# Patient Record
Sex: FEMALE | Race: WHITE | NOT HISPANIC OR LATINO | Employment: FULL TIME | URBAN - METROPOLITAN AREA
[De-identification: names, ages, dates, MRNs, and addresses within clinical notes are randomized per-mention and may not be internally consistent; named-entity substitution may affect disease eponyms.]

---

## 2018-08-17 ENCOUNTER — HOSPITAL ENCOUNTER (EMERGENCY)
Facility: HOSPITAL | Age: 38
Discharge: HOME | End: 2018-08-18
Attending: EMERGENCY MEDICINE | Admitting: EMERGENCY MEDICINE
Payer: MEDICAID

## 2018-08-17 VITALS
SYSTOLIC BLOOD PRESSURE: 117 MMHG | OXYGEN SATURATION: 98 % | HEART RATE: 85 BPM | TEMPERATURE: 98.2 F | RESPIRATION RATE: 18 BRPM | DIASTOLIC BLOOD PRESSURE: 76 MMHG

## 2018-08-17 DIAGNOSIS — H57.89 EYE DRAINAGE: Primary | ICD-10-CM

## 2018-08-18 PROCEDURE — 99283 EMERGENCY DEPT VISIT LOW MDM: CPT

## 2018-08-18 RX ORDER — SULFACETAMIDE SODIUM 100 MG/ML
2 SOLUTION/ DROPS OPHTHALMIC ONCE
Status: COMPLETED | OUTPATIENT
Start: 2018-08-18 | End: 2018-08-18

## 2018-08-18 RX ORDER — SULFACETAMIDE SODIUM 100 MG/ML
1-2 SOLUTION/ DROPS OPHTHALMIC EVERY 4 HOURS
Qty: 5 ML | Refills: 0 | Status: SHIPPED | OUTPATIENT
Start: 2018-08-18

## 2018-08-18 RX ADMIN — SULFACETAMIDE SODIUM 2 DROP: 100 SOLUTION OPHTHALMIC at 02:10

## 2018-08-18 NOTE — ED PROVIDER NOTES
History  Chief Complaint   Patient presents with    Eye Pain     painful right eye, times 3 days, worsens at nite  no drainage noted  today seeing blurry  no contacts or glasses       History provided by:  Patient  Eye Pain   Location:  Left eye with some redness and itchy, not really painful  Quality:  Aching  Severity:  Mild  Onset quality:  Gradual  Duration:  2 days  Timing:  Constant  Progression:  Worsening  Chronicity:  New  Context:  Pt has no eye trauma and does not wear conctacts/glasses  Relieved by:  Nothing  Worsened by:  Nothing  Ineffective treatments:  None  Associated symptoms: no abdominal pain, no chest pain, no fatigue, no fever, no headaches and no vomiting        None       History reviewed  No pertinent past medical history  History reviewed  No pertinent surgical history  History reviewed  No pertinent family history  I have reviewed and agree with the history as documented  Social History   Substance Use Topics    Smoking status: Never Smoker    Smokeless tobacco: Never Used    Alcohol use No        Review of Systems   Constitutional: Negative for fatigue and fever  Eyes: Positive for pain  Cardiovascular: Negative for chest pain  Gastrointestinal: Negative for abdominal pain and vomiting  Neurological: Negative for headaches  All other systems reviewed and are negative  Physical Exam  Physical Exam   Constitutional: She appears well-developed and well-nourished  HENT:   Head: Normocephalic and atraumatic  Eyes: EOM are normal  Pupils are equal, round, and reactive to light  Right eye exhibits no chemosis and no discharge  No foreign body present in the right eye  Left eye exhibits no chemosis and no discharge  No foreign body present in the left eye  Right conjunctiva is injected  Left conjunctiva is injected  Right eye exhibits normal extraocular motion  Left eye exhibits normal extraocular motion     Fundoscopic exam:       The right eye shows no hemorrhage  The left eye shows no hemorrhage  Slit lamp exam:       The right eye shows no corneal abrasion and no fluorescein uptake  The left eye shows no corneal abrasion and no fluorescein uptake  Neck: Neck supple  Cardiovascular: Normal rate  Pulmonary/Chest: Effort normal    Neurological: She is alert  Vitals reviewed  Vital Signs  ED Triage Vitals [08/17/18 2348]   Temperature Pulse Respirations Blood Pressure SpO2   98 2 °F (36 8 °C) 85 18 117/76 98 %      Temp Source Heart Rate Source Patient Position - Orthostatic VS BP Location FiO2 (%)   Oral Monitor Lying Right arm --      Pain Score       --           Vitals:    08/17/18 2348   BP: 117/76   Pulse: 85   Patient Position - Orthostatic VS: Lying       Visual Acuity  Visual Acuity      Most Recent Value   Visual acuity R eye is  20/20   Visual acuity Left eye is  20/20   Visual acuity in both eyes is  20/20          ED Medications  Medications   sulfacetamide (BLEPH-10) 10 % ophthalmic solution 2 drop (2 drops Left Eye Given 8/18/18 0210)       Diagnostic Studies  Results Reviewed     None                 No orders to display              Procedures  Procedures       Phone Contacts  ED Phone Contact    ED Course  ED Course as of Aug 29 1439   Sat Aug 18, 2018   0155 Left eye pressure 8                                MDM  Number of Diagnoses or Management Options  Eye drainage: new and does not require workup    CritCare Time    Disposition  Final diagnoses:   Eye drainage     Time reflects when diagnosis was documented in both MDM as applicable and the Disposition within this note     Time User Action Codes Description Comment    8/18/2018  1:50 AM Hamlet Mahan Add [H57 8] Eye drainage       ED Disposition     ED Disposition Condition Comment    Discharge  Vevelyn Sames discharge to home/self care      Condition at discharge: Good        Follow-up Information     Follow up With Specialties Details Why Contact Info Additional Information    [facility] Emergency Medicine   [address] [facility] [address]  [phone] [place] ED, [address] [address]   Call in 1 day  [address]  [phone]           Discharge Medication List as of 8/18/2018  1:56 AM      START taking these medications    Details   sulfacetamide (BLEPH-10) 10 % ophthalmic solution Administer 1-2 drops into the left eye every 4 (four) hours, Starting Sat 8/18/2018, Print           No discharge procedures on file      ED Provider  Electronically Signed by           Den Boykin MD  08/29/18 7260

## 2018-08-18 NOTE — DISCHARGE INSTRUCTIONS
Eye Pain   WHAT YOU NEED TO KNOW:   Eye pain may be caused by a problem within your eye  A problem or condition in another body area can also cause pain that travels to your eye  Some causes of eye pain include dry eyes, inflammation, a sinus infection, or a cluster headache  DISCHARGE INSTRUCTIONS:   Medicines: You may need any of the following:  · Artificial tears  are eyedrops that can help moisturize your eyes and relieve your pain  Ask your healthcare provider how often to use artificial tears  · NSAIDs , such as ibuprofen, help decrease swelling, pain, and fever  This medicine is available with or without a doctor's order  NSAIDs can cause stomach bleeding or kidney problems in certain people  If you take blood thinner medicine, always ask if NSAIDs are safe for you  Always read the medicine label and follow directions  Do not give these medicines to children under 10months of age without direction from your child's healthcare provider  · Take your medicine as directed  Contact your healthcare provider if you think your medicine is not helping or if you have side effects  Tell him of her if you are allergic to any medicine  Keep a list of the medicines, vitamins, and herbs you take  Include the amounts, and when and why you take them  Bring the list or the pill bottles to follow-up visits  Carry your medicine list with you in case of an emergency  Follow up with your healthcare provider as directed: You may be referred to an eye specialist  Write down your questions so you remember to ask them during your visits  Contact your healthcare provider if:   · You have a fever  · Your eye pain gets worse when you move your eyes  · You have questions or concerns about your condition or care  Return to the emergency department if:   · You have any vision loss  · You have sudden vision changes such as blurred vision, double vision, or seeing halos around lights       · You develop severe eye pain  © 2017 [facility] Information is for End User's use only and may not be sold, redistributed or otherwise used for commercial purposes  All illustrations and images included in CareNotes® are the copyrighted property of A D A M [employer] Inc  or Godfrey Ramos  The above information is an  only  It is not intended as medical advice for individual conditions or treatments  Talk to your doctor, nurse or pharmacist before following any medical regimen to see if it is safe and effective for you

## 2022-01-07 ENCOUNTER — NURSE TRIAGE (OUTPATIENT)
Dept: OTHER | Facility: OTHER | Age: 42
End: 2022-01-07

## 2022-01-07 DIAGNOSIS — Z20.822 ENCOUNTER FOR SCREENING LABORATORY TESTING FOR COVID-19 VIRUS: Primary | ICD-10-CM

## 2022-01-07 NOTE — TELEPHONE ENCOUNTER
Patient is requesting a covid test and does not have a [facility] PCP  Order placed  Patient informed of [address] now testing site and was advised of hours of operation, address, to wear a mask, and to stay in the car  Patient verbalized understanding  Patient already has a My Chart account to check for results  Advised patient to begin checking in 2-3 days after testing is completed  Reason for Disposition   [1] COVID-19 infection suspected by caller or triager AND [2] mild symptoms (cough, fever, or others) AND [3] has not gotten tested yet    Answer Assessment - Initial Assessment Questions  Were you within 6 feet or less, for up to 15 minutes or more with a person that has a confirmed COVID-19 test? Denies    What was the date of your exposure? Unknown    Are you experiencing any symptoms attributed to the virus?  (Assess for SOB, cough, fever, difficulty breathing) Fever (101 1F), chills, sore throat, cough, runny nose, headache, body aches, fatigue, mild loss of smell    HIGH RISK: Do you have any history heart or lung conditions, weakened immune system, diabetes, Asthma, CHF, HIV, COPD, Chemo, renal failure, sickle cell, etc? Denies    PREGNANCY: Are you pregnant or did you recently give birth? Denies    VACCINE: "Have you gotten the COVID-19 vaccine?" If Yes ask: "Which one, how many shots, when did you get it?" Yes, Pfizer x 2 shots; no booster yet    Protocols used: CORONAVIRUS (COVID-19) DIAGNOSED OR SUSPECTED-ADULT-[place]

## 2022-01-07 NOTE — TELEPHONE ENCOUNTER
Regarding: Covid Symptoamtic Chills 1 of 5 (Angie/Lyndsay)  ----- Message from [facility] sent at 1/7/2022  1:26 PM EST -----  "I have fever 101 1 (oral), chills, sore throat and cough   I have been vaccinated "

## 2022-01-08 PROCEDURE — U0005 INFEC AGEN DETEC AMPLI PROBE: HCPCS | Performed by: FAMILY MEDICINE

## 2022-01-08 PROCEDURE — U0003 INFECTIOUS AGENT DETECTION BY NUCLEIC ACID (DNA OR RNA); SEVERE ACUTE RESPIRATORY SYNDROME CORONAVIRUS 2 (SARS-COV-2) (CORONAVIRUS DISEASE [COVID-19]), AMPLIFIED PROBE TECHNIQUE, MAKING USE OF HIGH THROUGHPUT TECHNOLOGIES AS DESCRIBED BY CMS-2020-01-R: HCPCS | Performed by: FAMILY MEDICINE

## 2024-11-14 ENCOUNTER — OFFICE VISIT (OUTPATIENT)
Dept: URGENT CARE | Facility: CLINIC | Age: 44
End: 2024-11-14
Payer: COMMERCIAL

## 2024-11-14 VITALS
WEIGHT: 148 LBS | DIASTOLIC BLOOD PRESSURE: 58 MMHG | SYSTOLIC BLOOD PRESSURE: 98 MMHG | OXYGEN SATURATION: 98 % | RESPIRATION RATE: 18 BRPM | TEMPERATURE: 97.7 F | HEART RATE: 83 BPM

## 2024-11-14 DIAGNOSIS — J06.9 ACUTE URI: Primary | ICD-10-CM

## 2024-11-14 PROCEDURE — 99213 OFFICE O/P EST LOW 20 MIN: CPT | Performed by: PHYSICIAN ASSISTANT

## 2024-11-14 NOTE — PATIENT INSTRUCTIONS
Upper Respiratory Infection     Over-the-counter medications to help with symptoms   Plain Robitussin or plain Mucinex as directed on the packaging for mucus relief  Sudafed (for those without history of high blood pressure) or Coricidin HBP (for those with history of high blood pressure) for nasal/sinus congestion  Ibuprofen or Acetaminophen for pain, fever, or sore throat   Afrin nasal spray (do not use more than 5 days!)   Saline nasal spray or Flonase nasal spray for nasal congestion  Vitamin C supplements may help shorten duration of colds  Other measures to help with illness   Get plenty of rest   Increase your fluid intake while you feel ill (especially drinks with electrolytes such as Gatorade or Pedialyte)  Follow up with your primary care provider if:  You develop a fever after being fever-free (>100 degrees F)  You have mild chest tightness or mild shortness of breath   Symptoms worsen after initially getting better   Symptoms persist more than 10 days   Go to the emergency room if:   You have moderate to severe chest tightness or shortness of breath   You have any other severe or concerning symptoms

## 2024-11-14 NOTE — PROGRESS NOTES
[facility]        NAME: Gisela Adams is a 44 y.o. female  : 1980    MRN: [id number]  DATE: 2024  TIME: 6:48 PM      Assessment and Plan     Acute URI [J06.9]  1. Acute URI          Note:   Likely viral -- patient to continue OTC medications as needed for symptoms     Patient Instructions     Patient Instructions   Upper Respiratory Infection     Over-the-counter medications to help with symptoms   Plain Robitussin or plain Mucinex as directed on the packaging for mucus relief  Sudafed (for those without history of high blood pressure) or Coricidin HBP (for those with history of high blood pressure) for nasal/sinus congestion  Ibuprofen or Acetaminophen for pain, fever, or sore throat   Afrin nasal spray (do not use more than 5 days!)   Saline nasal spray or Flonase nasal spray for nasal congestion  Vitamin C supplements may help shorten duration of colds  Other measures to help with illness   Get plenty of rest   Increase your fluid intake while you feel ill (especially drinks with electrolytes such as Gatorade or Pedialyte)  Follow up with your primary care provider if:  You develop a fever after being fever-free (>100 degrees F)  You have mild chest tightness or mild shortness of breath   Symptoms worsen after initially getting better   Symptoms persist more than 10 days   Go to the emergency room if:   You have moderate to severe chest tightness or shortness of breath   You have any other severe or concerning symptoms        Follow up with primary care provider.   Go to ER if symptoms worsen.    Chief Complaint     Chief Complaint   Patient presents with    Cold Like Symptoms     Pt c/o migraine and sob and lightheaded and dizziness fpor the past week and sore throat for 3 days          History of Present Illness     Patient presents with sore throat, cough, headache, and lightheadedness x 1 week. She has been taking tylenol with mild relief.         Review of Systems      Review of Systems   Constitutional:  Negative for chills, fatigue and fever.   HENT:  Positive for sore throat. Negative for congestion, ear pain, postnasal drip, rhinorrhea, sinus pressure and sinus pain.    Eyes:  Negative for pain and visual disturbance.   Respiratory:  Positive for cough. Negative for chest tightness and shortness of breath.    Cardiovascular:  Negative for chest pain and palpitations.   Gastrointestinal:  Negative for abdominal pain, diarrhea, nausea and vomiting.   Genitourinary:  Negative for dysuria and hematuria.   Musculoskeletal:  Negative for arthralgias, back pain and myalgias.   Skin:  Negative for rash.   Neurological:  Positive for light-headedness and headaches. Negative for dizziness, seizures, syncope and numbness.   All other systems reviewed and are negative.        Current Medications       Current Outpatient Medications:     sulfacetamide (BLEPH-10) 10 % ophthalmic solution, Administer 1-2 drops into the left eye every 4 (four) hours (Patient not taking: Reported on 11/14/2024), Disp: 5 mL, Rfl: 0    Current Allergies     Allergies as of 11/14/2024    (No Known Allergies)              The following portions of the patient's history were reviewed and updated as appropriate: allergies, current medications, past family history, past medical history, past social history, past surgical history, and problem list.     History reviewed. No pertinent past medical history.    History reviewed. No pertinent surgical history.    History reviewed. No pertinent family history.      Medications have been verified.        Objective     BP 98/58   Pulse 83   Temp 97.7 °F (36.5 °C) (Tympanic)   Resp 18   Wt 67.1 kg (148 lb)   SpO2 98%   No LMP recorded.         Physical Exam     Physical Exam  Vitals and nursing note reviewed.   Constitutional:       Appearance: Normal appearance. She is normal weight.   HENT:      Head: Normocephalic and atraumatic.      Nose: Nose normal.       Mouth/Throat:      Mouth: Mucous membranes are moist.      Pharynx: Oropharynx is clear.   Cardiovascular:      Rate and Rhythm: Normal rate and regular rhythm.      Heart sounds: Normal heart sounds.   Pulmonary:      Effort: Pulmonary effort is normal.      Breath sounds: Normal breath sounds.   Skin:     General: Skin is warm and dry.   Neurological:      General: No focal deficit present.      Mental Status: She is alert and oriented to person, place, and time.   Psychiatric:         Mood and Affect: Mood normal.         Behavior: Behavior normal.